# Patient Record
Sex: MALE | Race: WHITE | Employment: FULL TIME | URBAN - METROPOLITAN AREA
[De-identification: names, ages, dates, MRNs, and addresses within clinical notes are randomized per-mention and may not be internally consistent; named-entity substitution may affect disease eponyms.]

---

## 2020-07-25 ENCOUNTER — HOSPITAL ENCOUNTER (EMERGENCY)
Age: 30
Discharge: HOME | End: 2020-07-25
Payer: COMMERCIAL

## 2020-07-25 VITALS
SYSTOLIC BLOOD PRESSURE: 126 MMHG | WEIGHT: 175 LBS | HEIGHT: 70 IN | TEMPERATURE: 98.5 F | RESPIRATION RATE: 18 BRPM | DIASTOLIC BLOOD PRESSURE: 80 MMHG | HEART RATE: 92 BPM | BODY MASS INDEX: 25.05 KG/M2 | OXYGEN SATURATION: 100 %

## 2020-07-25 PROCEDURE — 99282 EMERGENCY DEPT VISIT SF MDM: CPT

## 2020-07-25 PROCEDURE — 6370000000 HC RX 637 (ALT 250 FOR IP): Performed by: PHYSICIAN ASSISTANT

## 2020-07-25 RX ORDER — TETRACAINE HYDROCHLORIDE 5 MG/ML
2 SOLUTION OPHTHALMIC ONCE
Status: DISCONTINUED | OUTPATIENT
Start: 2020-07-25 | End: 2020-07-26 | Stop reason: HOSPADM

## 2020-07-25 RX ORDER — ERYTHROMYCIN 5 MG/G
OINTMENT OPHTHALMIC ONCE
Status: COMPLETED | OUTPATIENT
Start: 2020-07-25 | End: 2020-07-25

## 2020-07-25 RX ORDER — ERYTHROMYCIN 5 MG/G
OINTMENT OPHTHALMIC
Qty: 1 TUBE | Refills: 0 | Status: SHIPPED | OUTPATIENT
Start: 2020-07-25 | End: 2020-08-04

## 2020-07-25 RX ADMIN — ERYTHROMYCIN: 5 OINTMENT OPHTHALMIC at 22:23

## 2020-07-26 NOTE — ED PROVIDER NOTES
EMERGENCY DEPARTMENT ENCOUNTER      PCP: No primary care provider on file. CHIEF COMPLAINT    Chief Complaint   Patient presents with    Eye Problem     redness and swelling          This patient was not evaluated by the attending physician. I have independently evaluated this patient . HPI    [facility] 26 y.o. male who presents with itchy red left eye. Onset was today upon waking from a nap. The context is when he woke up from a nap he noticed that his eye was bloodshot and watering and itchy. .  The duration has been constant since onset. The quality of the symptoms is itchy. The location is left eye. The patient has some associated blurry vision. There are no aggravating or alleviating factors. He says he often gets pinkeye and this is exactly like previous episodes. No visual deficits or loss of visual field    REVIEW OF SYSTEMS    General: No fevers or chills  ENT:-See HPI  GI: No abdominal pain , nausea or vomiting  Skin: No new rash  Pulmonary: No shortness of breath or new cough    All other review of systems are negative  See HPI and nursing notes for additional information     PAST MEDICAL and SURGICAL HISTORY    History reviewed. No pertinent past medical history. History reviewed. No pertinent surgical history.     CURRENT MEDICATIONS        ALLERGIES    Allergies   Allergen Reactions    Pcn [Penicillins] Hives       SOCIAL and FAMILY HISTORY    Social History     Socioeconomic History    Marital status: Single     Spouse name: None    Number of children: None    Years of education: None    Highest education level: None   Occupational History    None   Social Needs    Financial resource strain: None    Food insecurity     Worry: None     Inability: None    Transportation needs     Medical: None     Non-medical: None   Tobacco Use    Smoking status: Never Smoker   Substance and Sexual Activity    Alcohol use: Yes     Comment: socially     Drug use: Never    Sexual activity: None   Lifestyle    Physical activity     Days per week: None     Minutes per session: None    Stress: None   Relationships    Social connections     Talks on phone: None     Gets together: None     Attends Orthodoxy service: None     Active member of club or organization: None     Attends meetings of clubs or organizations: None     Relationship status: None    Intimate partner violence     Fear of current or ex partner: None     Emotionally abused: None     Physically abused: None     Forced sexual activity: None   Other Topics Concern    None   Social History Narrative    None     History reviewed. No pertinent family history. PHYSICAL EXAM    VITAL SIGNS: Pulse 92   Temp 98.5 °F (36.9 °C) (Oral)   Resp 18   Ht 5' 10\" (1.778 m)   Wt 175 lb (79.4 kg)   SpO2 100%   BMI 25.11 kg/m²   Constitutional:  Well developed, well nourished, no acute distress,     Eyes:  Pupils equally round and reactive to light, sclerae nonicteric, conjunctiva moist   conjunctiva mildly injected without obvious corneal deficit, hypopyon, hyphema. EOMI. Funduscopic exam reveals red reflex intact with no obvious retinal abnormalities. No foreign bodies or obvious corneal deficit. No hyphema seen on tangential light    HENT:  Atraumatic, external ears normal, nose normal, oropharynx moist, no pharyngeal exudates. Neck/Lymphatics: supple, no JVD, no swollen nodes   Respiratory:  No retractions  Cardiovascular:  No JVD  Integument:  Warm dry skin, no obvious rash, no evidence of Vera sign  Neurologic:  No slurred speech, normal gait       Visual acuity:  See nurses note      ED COURSE & MEDICAL DECISION MAKING        Patient presents as above, history physical exam concerning for conjunctivitis of unspecified type. I have offered the patient examination with slit lamp but he is deferring that at this time. He is requesting empiric treatment for conjunctivitis.   I feel like this is very reasonable as long as he follows up closely with primary care or this department for any new or worsening symptoms or failure to resolve with treatment. I also recommend warm compresses on the eye and good eye hygiene, discussed today    Patient understands and agrees this plan of care, time allotted for answer questions return precautions reviewed at length. Patient agrees to return emergency department if symptoms worsen or any new symptoms develop. Vital signs and nursing notes reviewed during ED course. Clinical  IMPRESSION    1. Acute conjunctivitis of left eye, unspecified acute conjunctivitis type        Comment: Please note this report has been produced using speech recognition software and may contain errors related to that system including errors in grammar, punctuation, and spelling, as well as words and phrases that may be inappropriate. If there are any questions or concerns please feel free to contact the dictating provider for clarification.        [place], [address]  07/25/20 0947

## 2020-07-26 NOTE — ED NOTES
Pt presets to ED for discomfort in left eye. The eye is red and swollen.  Pt denies pain or difficulty with vision     Shekhar Powell RN  07/25/20 [id number]

## 2020-07-26 NOTE — ED NOTES
Pt states he does not want to complete a visual acuity screening.  Pt states he has no changes in his vision due to the irritation or swelling     Nicol Paez RN  07/25/20 9905

## 2022-08-03 ENCOUNTER — APPOINTMENT (OUTPATIENT)
Dept: GENERAL RADIOLOGY | Age: 32
End: 2022-08-03
Payer: COMMERCIAL

## 2022-08-03 ENCOUNTER — HOSPITAL ENCOUNTER (EMERGENCY)
Age: 32
Discharge: ANOTHER HOSPITAL | End: 2022-08-03
Attending: STUDENT IN AN ORGANIZED HEALTH CARE EDUCATION/TRAINING PROGRAM
Payer: COMMERCIAL

## 2022-08-03 VITALS
OXYGEN SATURATION: 98 % | SYSTOLIC BLOOD PRESSURE: 126 MMHG | DIASTOLIC BLOOD PRESSURE: 83 MMHG | TEMPERATURE: 98.4 F | RESPIRATION RATE: 16 BRPM | HEART RATE: 65 BPM

## 2022-08-03 DIAGNOSIS — S91.115A LACERATION OF SECOND TOE OF LEFT FOOT, INITIAL ENCOUNTER: ICD-10-CM

## 2022-08-03 DIAGNOSIS — S92.425B OPEN NONDISPLACED FRACTURE OF DISTAL PHALANX OF LEFT GREAT TOE, INITIAL ENCOUNTER: Primary | ICD-10-CM

## 2022-08-03 DIAGNOSIS — S91.112A LACERATION OF GREAT TOE OF LEFT FOOT, FOREIGN BODY PRESENCE UNSPECIFIED, NAIL DAMAGE STATUS UNSPECIFIED, INITIAL ENCOUNTER: ICD-10-CM

## 2022-08-03 LAB
ABO/RH: NORMAL
ANION GAP SERPL CALCULATED.3IONS-SCNC: 8 MMOL/L (ref 4–16)
ANTIBODY SCREEN: NEGATIVE
APTT: 23.2 SECONDS (ref 25.1–37.1)
BASOPHILS ABSOLUTE: 0 K/CU MM
BASOPHILS RELATIVE PERCENT: 0.3 % (ref 0–1)
BUN BLDV-MCNC: 9 MG/DL (ref 6–23)
CALCIUM SERPL-MCNC: 8.7 MG/DL (ref 8.3–10.6)
CHLORIDE BLD-SCNC: 106 MMOL/L (ref 99–110)
CO2: 26 MMOL/L (ref 21–32)
CREAT SERPL-MCNC: 0.8 MG/DL (ref 0.9–1.3)
DIFFERENTIAL TYPE: ABNORMAL
EOSINOPHILS ABSOLUTE: 0.2 K/CU MM
EOSINOPHILS RELATIVE PERCENT: 1.5 % (ref 0–3)
GFR AFRICAN AMERICAN: >60 ML/MIN/1.73M2
GFR NON-AFRICAN AMERICAN: >60 ML/MIN/1.73M2
GLUCOSE BLD-MCNC: 123 MG/DL (ref 70–99)
HCT VFR BLD CALC: 40.1 % (ref 42–52)
HEMOGLOBIN: 13.8 GM/DL (ref 13.5–18)
IMMATURE NEUTROPHIL %: 0.3 % (ref 0–0.43)
INR BLD: 0.91 INDEX
LYMPHOCYTES ABSOLUTE: 1.1 K/CU MM
LYMPHOCYTES RELATIVE PERCENT: 10 % (ref 24–44)
MCH RBC QN AUTO: 31.5 PG (ref 27–31)
MCHC RBC AUTO-ENTMCNC: 34.4 % (ref 32–36)
MCV RBC AUTO: 91.6 FL (ref 78–100)
MONOCYTES ABSOLUTE: 0.5 K/CU MM
MONOCYTES RELATIVE PERCENT: 4.6 % (ref 0–4)
NUCLEATED RBC %: 0 %
PDW BLD-RTO: 12.2 % (ref 11.7–14.9)
PLATELET # BLD: 235 K/CU MM (ref 140–440)
PMV BLD AUTO: 10.2 FL (ref 7.5–11.1)
POTASSIUM SERPL-SCNC: 4.3 MMOL/L (ref 3.5–5.1)
PROTHROMBIN TIME: 11.7 SECONDS (ref 11.7–14.5)
RBC # BLD: 4.38 M/CU MM (ref 4.6–6.2)
SEGMENTED NEUTROPHILS ABSOLUTE COUNT: 9.2 K/CU MM
SEGMENTED NEUTROPHILS RELATIVE PERCENT: 83.3 % (ref 36–66)
SODIUM BLD-SCNC: 140 MMOL/L (ref 135–145)
TOTAL IMMATURE NEUTOROPHIL: 0.03 K/CU MM
TOTAL NUCLEATED RBC: 0 K/CU MM
WBC # BLD: 11.1 K/CU MM (ref 4–10.5)

## 2022-08-03 PROCEDURE — 86901 BLOOD TYPING SEROLOGIC RH(D): CPT

## 2022-08-03 PROCEDURE — 2580000003 HC RX 258: Performed by: STUDENT IN AN ORGANIZED HEALTH CARE EDUCATION/TRAINING PROGRAM

## 2022-08-03 PROCEDURE — 96376 TX/PRO/DX INJ SAME DRUG ADON: CPT

## 2022-08-03 PROCEDURE — 73630 X-RAY EXAM OF FOOT: CPT

## 2022-08-03 PROCEDURE — 96375 TX/PRO/DX INJ NEW DRUG ADDON: CPT

## 2022-08-03 PROCEDURE — 96365 THER/PROPH/DIAG IV INF INIT: CPT

## 2022-08-03 PROCEDURE — 86850 RBC ANTIBODY SCREEN: CPT

## 2022-08-03 PROCEDURE — 85025 COMPLETE CBC W/AUTO DIFF WBC: CPT

## 2022-08-03 PROCEDURE — 85730 THROMBOPLASTIN TIME PARTIAL: CPT

## 2022-08-03 PROCEDURE — 6360000002 HC RX W HCPCS: Performed by: STUDENT IN AN ORGANIZED HEALTH CARE EDUCATION/TRAINING PROGRAM

## 2022-08-03 PROCEDURE — 12004 RPR S/N/AX/GEN/TRK7.6-12.5CM: CPT

## 2022-08-03 PROCEDURE — 90715 TDAP VACCINE 7 YRS/> IM: CPT | Performed by: STUDENT IN AN ORGANIZED HEALTH CARE EDUCATION/TRAINING PROGRAM

## 2022-08-03 PROCEDURE — 86900 BLOOD TYPING SEROLOGIC ABO: CPT

## 2022-08-03 PROCEDURE — 85610 PROTHROMBIN TIME: CPT

## 2022-08-03 PROCEDURE — 80048 BASIC METABOLIC PNL TOTAL CA: CPT

## 2022-08-03 PROCEDURE — 90471 IMMUNIZATION ADMIN: CPT | Performed by: STUDENT IN AN ORGANIZED HEALTH CARE EDUCATION/TRAINING PROGRAM

## 2022-08-03 PROCEDURE — 2500000003 HC RX 250 WO HCPCS: Performed by: STUDENT IN AN ORGANIZED HEALTH CARE EDUCATION/TRAINING PROGRAM

## 2022-08-03 PROCEDURE — 99285 EMERGENCY DEPT VISIT HI MDM: CPT

## 2022-08-03 RX ORDER — MORPHINE SULFATE 4 MG/ML
4 INJECTION, SOLUTION INTRAMUSCULAR; INTRAVENOUS ONCE
Status: COMPLETED | OUTPATIENT
Start: 2022-08-03 | End: 2022-08-03

## 2022-08-03 RX ORDER — LIDOCAINE HYDROCHLORIDE 20 MG/ML
10 INJECTION, SOLUTION INFILTRATION; PERINEURAL ONCE
Status: COMPLETED | OUTPATIENT
Start: 2022-08-03 | End: 2022-08-03

## 2022-08-03 RX ADMIN — MORPHINE SULFATE 4 MG: 4 INJECTION, SOLUTION INTRAMUSCULAR; INTRAVENOUS at 17:23

## 2022-08-03 RX ADMIN — CEFAZOLIN 2000 MG: 2 INJECTION, POWDER, FOR SOLUTION INTRAMUSCULAR; INTRAVENOUS at 17:23

## 2022-08-03 RX ADMIN — LIDOCAINE HYDROCHLORIDE 10 ML: 20 INJECTION, SOLUTION INFILTRATION; PERINEURAL at 18:25

## 2022-08-03 RX ADMIN — MORPHINE SULFATE 4 MG: 4 INJECTION, SOLUTION INTRAMUSCULAR; INTRAVENOUS at 19:30

## 2022-08-03 RX ADMIN — TETANUS TOXOID, REDUCED DIPHTHERIA TOXOID AND ACELLULAR PERTUSSIS VACCINE, ADSORBED 0.5 ML: 5; 2.5; 8; 8; 2.5 SUSPENSION INTRAMUSCULAR at 17:22

## 2022-08-03 ASSESSMENT — PAIN DESCRIPTION - LOCATION: LOCATION: FOOT;TOE (COMMENT WHICH ONE)

## 2022-08-03 ASSESSMENT — PAIN - FUNCTIONAL ASSESSMENT
PAIN_FUNCTIONAL_ASSESSMENT: 0-10
PAIN_FUNCTIONAL_ASSESSMENT: 0-10

## 2022-08-03 ASSESSMENT — PAIN SCALES - GENERAL
PAINLEVEL_OUTOF10: 5

## 2022-08-03 ASSESSMENT — PAIN DESCRIPTION - ONSET: ONSET: ON-GOING

## 2022-08-03 ASSESSMENT — PAIN DESCRIPTION - FREQUENCY: FREQUENCY: CONTINUOUS

## 2022-08-03 ASSESSMENT — PAIN DESCRIPTION - DESCRIPTORS: DESCRIPTORS: ACHING

## 2022-08-03 NOTE — ED NOTES
Pt found to be resting with family at bedside. Pt updated on timing and status. Pt medicated as ordered. Pt VS obtained and recorded. Pt denies further needs at this time.      Jack Dos Santos RN  08/03/22 1943

## 2022-08-03 NOTE — ED PROVIDER NOTES
Emergency Department Encounter    Patient: Rohit Quach  MRN: [id number]  : 1990  Date of Evaluation: 8/3/2022  ED Provider:  Cory Rosales DO    Triage Chief Complaint:   Laceration (Big toe and second toe lacerations from a .)    [place]:  Rohit Quach is a 27 y.o. male  who presented to the ED via EMS for evaluation of left big toe injury. Patient got injured while he was trying to mow the lawn. No prior history of similar injury. Patient did not have any fall or head injury in the process. ROS - see HPI, below listed is current ROS at time of my eval:  General:  No fevers, no chills, no weakness  Eyes:  No recent vison changes, no discharge  ENT:  No sore throat, no nasal congestion, no hearing changes  Cardiovascular:  No chest pain, no palpitations  Respiratory:  No shortness of breath, no cough, no wheezing  Gastrointestinal:  No pain, no nausea, no vomiting, no diarrhea  Musculoskeletal: Left big toe wound  Skin:  No rash, no pruritis, no easy bruising  Neurologic:  No speech problems, no headache, no extremity numbness, no extremity tingling, no extremity weakness  Psychiatric:  No anxiety  Genitourinary:  No dysuria, no hematuria  Endocrine:  No unexpected weight gain, no unexpected weight loss  Extremities:  no edema, no pain    No past medical history on file. No past surgical history on file. No family history on file.   Social History     Socioeconomic History    Marital status: Single     Spouse name: Not on file    Number of children: Not on file    Years of education: Not on file    Highest education level: Not on file   Occupational History    Not on file   Tobacco Use    Smoking status: Never    Smokeless tobacco: Not on file   Substance and Sexual Activity    Alcohol use: Yes     Comment: socially     Drug use: Never    Sexual activity: Not on file   Other Topics Concern    Not on file   Social History Narrative    Not on file     Social Determinants of Health     Financial Resource Strain: Not on file   Food Insecurity: Not on file   Transportation Needs: Not on file   Physical Activity: Not on file   Stress: Not on file   Social Connections: Not on file   Intimate Partner Violence: Not on file   Housing Stability: Not on file     No current facility-administered medications for this encounter. No current outpatient medications on file. Allergies   Allergen Reactions    Pcn [Penicillins] Hives       Nursing Notes Reviewed    Physical Exam:  Triage VS:    ED Triage Vitals   Enc Vitals Group      BP 08/03/22 1645 127/85      Heart Rate 08/03/22 1645 82      Resp 08/03/22 1645 16      Temp 08/03/22 1633 98.4 °F (36.9 °C)      Temp Source 08/03/22 1633 Oral      SpO2 08/03/22 1645 99 %      Weight --       Height --       Head Circumference --       Peak Flow --       Pain Score --       Pain Loc --       Pain Edu? --       Excl. in [address]? --        My pulse ox interpretation is - normal    General appearance:  No acute distress. Skin:  Warm. Dry. Eye:  Extraocular movements intact. Ears, nose, mouth and throat:  Oral mucosa moist   Neck:  Trachea midline. Extremity: Left PICC to the laceration of the dorsal aspect. Nailbed injury. Heart:  Regular rate and rhythm, normal S1 & S2, no extra heart sounds. Perfusion:  intact  Respiratory:  Lungs clear to auscultation bilaterally. Respirations nonlabored. Abdominal:  Normal bowel sounds. Soft. Nontender. Non distended. Back:  No CVA tenderness to palpation     Neurological:  Alert and oriented times 3. No focal neuro deficits.              Psychiatric:  Appropriate    I have reviewed and interpreted all of the currently available lab results from this visit (if applicable):  Results for orders placed or performed during the hospital encounter of 08/03/22   CBC with Auto Differential   Result Value Ref Range    WBC 11.1 (H) 4.0 - 10.5 K/CU MM    RBC 4.38 (L) 4.6 - 6.2 M/CU MM    Hemoglobin 13.8 13.5 - 18.0 GM/DL    Hematocrit 40.1 (L) 42 - 52 %    MCV 91.6 78 - 100 FL    MCH 31.5 (H) 27 - 31 PG    MCHC 34.4 32.0 - 36.0 %    RDW 12.2 11.7 - 14.9 %    Platelets 342 964 - 892 K/CU MM    MPV 10.2 7.5 - 11.1 FL    Differential Type AUTOMATED DIFFERENTIAL     Segs Relative 83.3 (H) 36 - 66 %    Lymphocytes % 10.0 (L) 24 - 44 %    Monocytes % 4.6 (H) 0 - 4 %    Eosinophils % 1.5 0 - 3 %    Basophils % 0.3 0 - 1 %    Segs Absolute 9.2 K/CU MM    Lymphocytes Absolute 1.1 K/CU MM    Monocytes Absolute 0.5 K/CU MM    Eosinophils Absolute 0.2 K/CU MM    Basophils Absolute 0.0 K/CU MM    Nucleated RBC % 0.0 %    Total Nucleated RBC 0.0 K/CU MM    Total Immature Neutrophil 0.03 K/CU MM    Immature Neutrophil % 0.3 0 - 0.43 %   Basic Metabolic Panel w/ Reflex to MG   Result Value Ref Range    Sodium 140 135 - 145 MMOL/L    Potassium 4.3 3.5 - 5.1 MMOL/L    Chloride 106 99 - 110 mMol/L    CO2 26 21 - 32 MMOL/L    Anion Gap 8 4 - 16    BUN 9 6 - 23 MG/DL    Creatinine 0.8 (L) 0.9 - 1.3 MG/DL    Glucose 123 (H) 70 - 99 MG/DL    Calcium 8.7 8.3 - 10.6 MG/DL    GFR Non-African American >60 >60 mL/min/1.73m2    GFR African American >60 >60 mL/min/1.73m2   Protime-INR   Result Value Ref Range    Protime 11.7 11.7 - 14.5 SECONDS    INR 0.91 INDEX   APTT   Result Value Ref Range    aPTT 23.2 (L) 25.1 - 37.1 SECONDS   TYPE AND SCREEN   Result Value Ref Range    ABO/Rh A POSITIVE     Antibody Screen NEGATIVE       Radiographs (if obtained):  Radiologist's Report Reviewed:  XR FOOT LEFT (MIN 3 VIEWS)   Final Result   Open fracture of the great toe distal phalanx. Again radiodensities are seen in that region which may be foreign bodies   versus artifactual from the bandage. EKG (if obtained): (All EKG's are interpreted by myself in the absence of a cardiologist)    Nerve block  Patient gave informed consent for nerve block to enable a thorough cleaning and exploration of the wound.   5 mils of lidocaine without epinephrine was used injected into the webspace between the great toe and the second toe as well as proximal medial aspect of the great toe. The area was prepped with chlorhexidine prior to the administration of IM lidocaine. Anesthesia was achieved within 2 minutes. Procedure was without complication and patient tolerated without difficulty. Procedure Note - Laceration repair: Left great toe  Questions were sought and answered and verbal consent was given by patient for the procedure. The area was prepped and draped in standard bedside fashion. The wound area was anesthetized with 5ml of Lidocaine 2% without epinephrine. The wound was cleansed and then explored with Foreign bodies found and removed. The wound was repaired with 4-0 Ethilon; 5 sutures were used. The patient tolerated the procedure well without complications and my repeat neurovascular exam post-procedure is unchanged. Procedure note-laceration repair: Left second toe  Questions were sought and answered and verbal consent was given by patient for the procedure. The area was prepped and draped in standard bedside fashion. The wound area was anesthetized with 5ml of Lidocaine 2% without epinephrine. The wound was cleansed and then explored with Foreign bodies found and removed. The wound was repaired with 4-0 Ethilon; 4 sutures were placed. A 5th suture was placed with absorbable sutures because there was no skin coverage of the laceration in that part of the wound. The patient tolerated the procedure well without complications and my repeat neurovascular exam post-procedure is unchanged. MDM:  Patient is a 79-year-old male who presented to the ED via EMS for evaluation of left big toe injury. Patient got injured while he was trying to mow the lawn.   Patient has the dorsal aspect of the left big to prescription of by the mowing machine creating a dorsal injury measuring about 5 cm x 3 cm covering most of the dorsal surface of the left big toe  There was also a 3 cm-4 cm laceration in the left second toe. The two wounds we are repaired in the ED. Procedure was without complication and patient tolerated without difficulty. Primary Survey  - Airway intact  - Bilateral breath sounds  - Bilateral pulses; radial/femoral/dorsalis pedis  - GCS 15, PERRLA    Secondary Survey   -A 5 cm x 3 cm gaping wound in the left big toe dorsal aspect, with bleeding and clot formation. - 3cm laceration in the L 2nd toe. Patient is given pain medications, antibiotics (Ancef)and a tetanus shot in the ED.        7:34 PM  Case discussed with [facility] transfer center. Patient accepted at [place] for trauma  Accepting doctor is Dr. Moseley Bone:  1. Open nondisplaced fracture of distal phalanx of left great toe, initial encounter    2. Laceration of great toe of left foot, foreign body presence unspecified, nail damage status unspecified, initial encounter    3. Laceration of second toe of left foot, initial encounter      Disposition referral (if applicable):  No follow-up provider specified. Disposition medications (if applicable):  New Prescriptions    No medications on file     ED Provider Disposition Time  DISPOSITION Decision To Transfer 08/03/2022 07:42:10 PM      Comment: Please note this report has been produced using speech recognition software and may contain errors related to that system including errors in grammar, punctuation, and spelling, as well as words and phrases that may be inappropriate. Efforts were made to edit the dictations.         Jayjay Olivas,   08/03/22 [id number]

## 2022-08-03 NOTE — ED TRIAGE NOTES
The patient presents to the emergency department alert and oriented with a complaint of injury to his left big toe and second left toe from a . The patient denies any other injuries. The patient placed on the monitor with vital signs taken. Assessment as follows; Skin is pink, warm and dry. There is an avulsion injury to the anterior aspect of bother toes.